# Patient Record
Sex: MALE | Race: OTHER | HISPANIC OR LATINO | Employment: UNEMPLOYED | URBAN - METROPOLITAN AREA
[De-identification: names, ages, dates, MRNs, and addresses within clinical notes are randomized per-mention and may not be internally consistent; named-entity substitution may affect disease eponyms.]

---

## 2019-01-31 ENCOUNTER — OFFICE VISIT (OUTPATIENT)
Dept: FAMILY MEDICINE CLINIC | Facility: CLINIC | Age: 34
End: 2019-01-31

## 2019-01-31 VITALS
DIASTOLIC BLOOD PRESSURE: 88 MMHG | OXYGEN SATURATION: 97 % | WEIGHT: 166 LBS | RESPIRATION RATE: 18 BRPM | HEART RATE: 70 BPM | HEIGHT: 65 IN | BODY MASS INDEX: 27.66 KG/M2 | SYSTOLIC BLOOD PRESSURE: 120 MMHG | TEMPERATURE: 95.1 F

## 2019-01-31 DIAGNOSIS — M54.50 CHRONIC MIDLINE LOW BACK PAIN WITHOUT SCIATICA: Primary | ICD-10-CM

## 2019-01-31 DIAGNOSIS — G89.29 CHRONIC MIDLINE LOW BACK PAIN WITHOUT SCIATICA: Primary | ICD-10-CM

## 2019-01-31 PROCEDURE — 96372 THER/PROPH/DIAG INJ SC/IM: CPT | Performed by: NURSE PRACTITIONER

## 2019-01-31 PROCEDURE — 99203 OFFICE O/P NEW LOW 30 MIN: CPT | Performed by: NURSE PRACTITIONER

## 2019-01-31 RX ORDER — IBUPROFEN 600 MG/1
600 TABLET ORAL EVERY 6 HOURS PRN
Qty: 30 TABLET | Refills: 0 | Status: SHIPPED | OUTPATIENT
Start: 2019-01-31

## 2019-01-31 RX ORDER — CYCLOBENZAPRINE HCL 10 MG
10 TABLET ORAL
Qty: 30 TABLET | Refills: 0 | Status: SHIPPED | OUTPATIENT
Start: 2019-01-31

## 2019-01-31 RX ORDER — KETOROLAC TROMETHAMINE 30 MG/ML
30 INJECTION, SOLUTION INTRAMUSCULAR; INTRAVENOUS ONCE
Status: COMPLETED | OUTPATIENT
Start: 2019-01-31 | End: 2019-01-31

## 2019-01-31 RX ADMIN — KETOROLAC TROMETHAMINE 30 MG: 30 INJECTION, SOLUTION INTRAMUSCULAR; INTRAVENOUS at 13:23

## 2019-01-31 NOTE — PATIENT INSTRUCTIONS
Dolor crónico de espalda   CUIDADO AMBULATORIO:   Dolor crónico de espalda  es aquel que dura 3 meses o más  Puede incluir el dolor que no se ha controlado o no mejore con el tratamiento  Puede que aguilera dolor de espalda cause debilidad o dolor que se propaga a garett brazos o piernas  Busque atención médica inmediata para los siguientes síntomas:   · Dolor intenso    · Usted tiene signos nuevos de adormecimiento o debilidad, sobre todo en la parte inferior de la espalda, piernas, brazos o genitales  · Usted pierde control de aguilera vejiga o heces  · Usted tiene fiebre o pérdida de peso repentina  Pregúntele a aguilera Rog[place]eady vitaminas y minerales son adecuados para usted  · Usted tiene un dolor nuevo o peor  · Usted tiene preguntas o inquietudes acerca de aguilera condición o cuidado  El tratamiento para el dolor crónico de espalda  podría incluir cualquiera de los siguientes:  · AINEs (Analgésicos antiinflamatorios no esteroides) juan el ibuprofeno, ayudan a disminuir la inflamación, el dolor y la fiebre  Lesvia medicamento esta disponible con o sin mateo receta médica  Los AINEs pueden causar sangrado estomacal o problemas renales en ciertas personas  Si usted esta tomando un anticoágulante,  siempre  pregunte si los AINEs son seguros para usted  Siempre jessica la etiqueta de lesvia medicamento y [place] instrucciones  No administre lesvia medicamento a niños menores de 6 meses de melissa sin antes obtener la autorización de aguilera médico      · El acetaminofén  denzel el dolor  Está disponible sin receta médica  Pregunte la cantidad y la frecuencia con que debe tomarlos  [address]  El acetaminofén puede causar daño en el hígado cuando no se ankush de forma correcta  · Un medicamento con receta para el dolor  podrían ser Joseph Eubanks  Pregunte cómo sixto estos medicamentos de mateo forma blandon  · Relajantes musculares  ayudan a disminuir los espasmos musculares y el dolor de espalda      · [place] garett medicamentos juan se le haya indicado  Consulte con leslie médico si usted duncan que leslie medicamento no le está ayudando o si presenta efectos secundarios  Infórmele si es alérgico a cualquier medicamento  Mantenga mateo lista actualizada de los [address], las vitaminas y los productos herbales que ankush  Incluya los siguientes datos de los medicamentos: cantidad, frecuencia y motivo de administración  Traiga con usted la lista o los envases de la píldoras a garett citas de seguimiento  Lleve la lista de los medicamentos con usted en je de mateo emergencia  Controle leslie dolor crónico de espalda:   · La aplicación de calor  en la espalda de 20 a 30 minutos cada 2 horas por los [employer] indiquen  El calor ayuda a disminuir el dolor y los espasmos musculares  · Manténgase activo  lo más que pueda sin causar [employer]  Pregúntele a leslie médico cuáles ejercicios son correctos para usted  No se siente ni se acueste yessica largos períodos  Debido a que un movimiento brusco podría empeorar leslie dolor de espalda  Evite levantar objetos hasta que ya no tenga dolor  · Vaya a terapia física según indicaciones  Un fisioterapeuta le puede enseñar ejercicios para ayudarle a mejorar el movimiento y la fuerza, y para disminuir el dolor  Acuda a garett consultas de control con leslie médico según le indicaron  Es probable que lo refieran a un especialista en medicina del deporte o un especialista en ama dorsal  Anote garett preguntas para que se acuerde de hacerlas yesisca garett visitas  © 2017 [address] Information is for End User's use only and may not be sold, redistributed or otherwise used for commercial purposes  All illustrations and images included in CareNotes® are the copyrighted property of A D A M , Inc  or Eric Brie  Esta información es sólo para uso en educación  Leslie intención no es darle un consejo médico sobre enfermedades o tratamientos   Colsulte con leslie Mirna Arreola farmacéutico antes de seguir cualquier régimen médico para saber si es seguro y efectivo para usted

## 2019-01-31 NOTE — ASSESSMENT & PLAN NOTE
In office Toradol 30mg/ml given today  Advised to wait at least 6-8 hours before staring Ibuprofen 600mg  I am also prescribing flexeril 10mg at bedtime to ease with pain at bedtime  Pt awaiting medical insurance for referral to pain management

## 2019-01-31 NOTE — PROGRESS NOTES
Assessment/Plan:    Chronic midline low back pain without sciatica  In office Toradol 30mg/ml given today  Advised to wait at least 6-8 hours before staring Ibuprofen 600mg  I am also prescribing flexeril 10mg at bedtime to ease with pain at bedtime  Pt awaiting medical insurance for referral to pain management  Diagnoses and all orders for this visit:    Chronic midline low back pain without sciatica  -     Cancel: Ambulatory referral to Pain Management; Future  -     ketorolac (TORADOL) injection 30 mg; Inject 1 mL (30 mg total) into a muscle once   -     ibuprofen (MOTRIN) 600 mg tablet; Take 1 tablet (600 mg total) by mouth every 6 (six) hours as needed for mild pain  -     cyclobenzaprine (FLEXERIL) 10 mg tablet; Take 1 tablet (10 mg total) by mouth daily at bedtime          Subjective:      Patient ID: Mirella Singh is a 28 y o  male  Cc  Per pt  Severe back pain  28year old male patient presents today for sever back pain  States 12 years ago he fell at work from a 1st floor and landed on his bottom  States that he never went to see a doctor or ER as he was scared of his bosses or getting in trouble  Since then he has been suffereing in pain and this has gradually worsened  Today his pain is a 8/10 and describes it as a shooting pain up his back in the lumbar spine area  Pt does admit to having gone to see a chiropractor whom did xrays and told patient he has herniated disc and recommended physical therapy and pain killers  Pt does not want to rely on pain killers and does not have medical insurance as of now for physical therapy  Sitting for prolonged periods and bending, twisting hurts his lower back  Denies any problems with urination or bowel movements  Denies any tingling and numbness of his legs  Back Pain   This is a chronic problem  The current episode started more than 1 year ago  The problem occurs constantly  The problem has been gradually worsening since onset   The pain is present in the lumbar spine  The quality of the pain is described as shooting  The pain does not radiate  The pain is at a severity of 8/10  The pain is severe  The pain is worse during the day  The symptoms are aggravated by sitting, twisting, bending and position  Stiffness is present in the morning  Pertinent negatives include no abdominal pain, bladder incontinence, bowel incontinence, dysuria, fever, headaches, leg pain, numbness, pelvic pain or tingling  Risk factors include sedentary lifestyle and poor posture (12 years fell from a 1st floor at work)  He has tried chiropractic manipulation, analgesics and heat for the symptoms  The treatment provided mild relief  The following portions of the patient's history were reviewed and updated as appropriate: allergies, current medications, past family history, past medical history, past social history, past surgical history and problem list     Review of Systems   Constitutional: Negative  Negative for fever  HENT: Negative  Eyes: Negative  Respiratory: Negative  Negative for cough and shortness of breath  Cardiovascular: Negative  Gastrointestinal: Negative  Negative for abdominal pain and bowel incontinence  Endocrine: Negative  Genitourinary: Negative  Negative for bladder incontinence, dysuria and pelvic pain  Musculoskeletal: Positive for back pain  Skin: Negative  Neurological: Negative for dizziness, tingling, numbness and headaches  Psychiatric/Behavioral: Negative  Objective:      /88 (BP Location: Left arm, Patient Position: Sitting, Cuff Size: Standard)   Pulse 70   Temp (!) 95 1 °F (35 1 °C) (Oral)   Resp 18   Ht 5' 5" (1 651 m)   Wt 75 3 kg (166 lb)   SpO2 97%   BMI 27 62 kg/m²          Physical Exam   Constitutional: He is oriented to person, place, and time  He appears well-developed and well-nourished  No distress  HENT:   Head: Normocephalic and atraumatic     Eyes: Pupils are equal, round, and reactive to light  Conjunctivae and EOM are normal    Neck: Normal range of motion  Cardiovascular: Normal rate, regular rhythm and normal heart sounds  Pulmonary/Chest: Effort normal and breath sounds normal    Musculoskeletal: Normal range of motion  He exhibits tenderness  Lumbar back: He exhibits tenderness  He exhibits normal range of motion, no swelling and no edema  Neurological: He is alert and oriented to person, place, and time  He has normal reflexes  Skin: Skin is warm and dry  He is not diaphoretic  Psychiatric: He has a normal mood and affect  Thought content normal    Nursing note and vitals reviewed

## 2020-02-12 ENCOUNTER — TELEPHONE (OUTPATIENT)
Dept: FAMILY MEDICINE CLINIC | Facility: CLINIC | Age: 35
End: 2020-02-12

## 2020-02-12 NOTE — TELEPHONE ENCOUNTER
Called patient to schedule an appt states he moved to [facility], please remove pcp field

## 2020-02-13 NOTE — TELEPHONE ENCOUNTER
02/13/20 11:11 AM     Thank you for your request  Your request has been received, reviewed, and the patient chart updated  The PCP has successfully been removed with a patient attribution note  This message will now be completed      Thank you  Radha Dobson